# Patient Record
Sex: FEMALE | Race: WHITE | NOT HISPANIC OR LATINO
[De-identification: names, ages, dates, MRNs, and addresses within clinical notes are randomized per-mention and may not be internally consistent; named-entity substitution may affect disease eponyms.]

---

## 2021-12-02 ENCOUNTER — TRANSCRIPTION ENCOUNTER (OUTPATIENT)
Age: 26
End: 2021-12-02

## 2023-02-27 ENCOUNTER — APPOINTMENT (OUTPATIENT)
Dept: ORTHOPEDIC SURGERY | Facility: CLINIC | Age: 28
End: 2023-02-27
Payer: COMMERCIAL

## 2023-02-27 VITALS — WEIGHT: 170 LBS | HEIGHT: 66 IN | BODY MASS INDEX: 27.32 KG/M2

## 2023-02-27 DIAGNOSIS — Z82.49 FAMILY HISTORY OF ISCHEMIC HEART DISEASE AND OTHER DISEASES OF THE CIRCULATORY SYSTEM: ICD-10-CM

## 2023-02-27 DIAGNOSIS — Z78.9 OTHER SPECIFIED HEALTH STATUS: ICD-10-CM

## 2023-02-27 PROCEDURE — 99204 OFFICE O/P NEW MOD 45 MIN: CPT

## 2023-02-27 PROCEDURE — 73562 X-RAY EXAM OF KNEE 3: CPT | Mod: LT

## 2023-02-28 NOTE — IMAGING
[Left] : left knee [AP] : anteroposterior [Lateral] : lateral [[place]] : sk[place] [There are no fractures, subluxations or dislocations. No significant abnormalities are seen] : There are no fractures, subluxations or dislocations. No significant abnormalities are seen [de-identified] : The patient is a well appearing 27 year  old female of their stated age. \par Patient ambulates with a normal gait. \par Negative straight leg raise bilateral \par \par Effected Knee: LEFT                         	 \par ROM:  0-145 degrees \par Lachman: POSITIVE \par Pivot Shift: POSITIVE \par Anterior Drawer: POSITIVE \par Posterior Drawer / Sag:Negative \par Varus Stress 0 degrees: Stable \par Varus Stress 30 degrees: Stable \par Valgus Stress 0 degrees: Stable \par Valgus Stress 30 degrees: Stable \par Medial Gavin: POSITIVE \par Lateral Gavin: Negative \par Patella Glide: 2+ \par Patella Apprehension: Negative \par Patella Grind: Negative \par \par Palpation: \par Medial Joint Line: TENDER \par Lateral Joint Line: Nontender \par Medial Collateral Ligament: TENDER \par Lateral Collateral Ligament/PLC: Nontender \par Distal Femur: Nontender \par Proximal Tibia: Nontender \par Tibial Tubercle: Nontender \par Distal Pole Patella: Nontender \par Quadriceps Tendon: Nontender &  Intact \par Patella Tendon: Nontender &  Intact \par Medial Femoral Condyle: Nontender \par Medial Distal Hamstring/PES: Nontender \par Lateral Distal Hamstring: Nontender & Stable \par Iliotibial Band: Nontender \par Medial Patellofemoral Ligament: Nontender \par Adductor: Nontender \par Proximal GSC-Plantaris: Nontender \par Calf: Supple & Nontender \par \par Inspection: \par Deformity: No \par Erythema: No \par Ecchymosis: No \par Abrasions: No \par Effusion: No \par Prepatella Bursitis: No \par Neurologic Exam: \par Sensation L4-S1: Grossly Intact \par Motor Exam: \par Quadriceps: 5 out of 5 \par Hamstrings: 5 out of 5 \par EHL: 5 out of 5 \par FHL: 5 out of 5 \par TA: 5 out of 5 \par GS: 5 out of 5 \par Circulatory/Pulses: \par Dorsalis Pedis: 2+ \par Posterior Tibialis: 2+ \par Additional Pertinent Findings: None \par \par Contralateral Knee:                           	 \par ROM: 0-145 degrees \par Other Pertinent Findings: None \par \par Assessment: The patient is a 27 year  old female with left knee pain and radiographic and physical exam findings consistent with possible ACL tear, MCL tear, and medial meniscus tear.   \par The patient’s condition is acute \par Documents/Results Reviewed Today: X-Ray left knee \par Tests/Studies Independently Interpreted Today: X-Ray left knee is unremarkable \par Pertinent findings include: MJLT, +Medial Gavin, +LM/PS/AD, MCL tenderness\par Confounding medical conditions/concerns: None\par \par Plan: Due to worsening pain and instability with mechanical symptoms, patient will obtain MRI left knee to evaluate for possible ACL tear, MCL tear and medial meniscus tear. Patient will begin physical therapy. Modify activity as discussed. \par Tests Ordered: MRI left knee \par Prescription Medications Ordered: Discussed appropriate use of OTC anti-inflammatories and analgesics (including but not limited to Aleve, Advil, Tylenol, Motrin, Ibuprofen, Voltaren gel, etc.) \par Braces/DME Ordered: None \par Activity/Work/Sports Status: None \par Additional Instructions: None\par Follow-Up: After MRI \par \par The patient's current medication management of their orthopedic diagnosis was reviewed today:\par (1) We discussed a comprehensive treatment plan that included possible pharmaceutical management involving the use of prescription strength medications including but not limited to options such as oral Naprosyn 500mg BID, once daily Meloxicam 15 mg, or 500-650 mg Tylenol versus over the counter oral medications and topical prescription NSAID Pennsaid vs over the counter Voltaren gel.  Based on our extensive discussion, the patient declined prescription medication and will use over the counter Advil, Alleve, Voltaren Gel or Tylenol as directed.\par (2) There is a moderate risk of morbidity with further treatment, especially from use of prescription strength medications and possible side effects of these medications which include upset stomach with oral medications, skin reactions to topical medications and cardiac/renal issues with long term use.\par (3) I recommended that the patient follow-up with their medical physician to discuss any significant specific potential issues with long term medication use such as interactions with current medications or with exacerbation of underlying medical comorbidities.\par (4) The benefits and risks associated with use of injectable, oral or topical, prescription and over the counter anti-inflammatory medications were discussed with the patient. The patient voiced understanding of the risks including but not limited to bleeding, stroke, kidney dysfunction, heart disease, and were referred to the black box warning label for further information. \par \par IKaren attest that this documentation has been prepared under the direction and in the presence of Provider Dr. Tyler Chaudhary. \par \par The documentation recorded by the scribe accurately reflects the services IDr. Tyler, personally performed and the decisions made by me.\par  [FreeTextEntry9] :  X-Ray left knee is unremarkable

## 2023-02-28 NOTE — HISTORY OF PRESENT ILLNESS
[de-identified] : The patient is a 27 year  old right hand dominant female who presents today complaining of left knee pain.  \par Date of Injury/Onset: 2/19/23\par Pain:    At Rest: 4/10 \par With Activity:  6/10 \par Mechanism of injury: patient was skiing when she hit a patch of ice and fell and her knee got twisted\par This is NOT a Work Related Injury being treated under Worker's Compensation.\par This is NOT an athletic injury occurring associated with an interscholastic or organized sports team.\par Quality of symptoms: instability, dull, fatigued\par Improves with: brace, rest, ice\par Worse with: walking, standing, stairs\par Prior treatment: ED in [place], PT\par Prior Imaging: Xray\par Out of work/sport: no, since n/a\par School/Sport/Position/Occupation: teacher\par Additional Information: None\par

## 2023-03-01 ENCOUNTER — FORM ENCOUNTER (OUTPATIENT)
Age: 28
End: 2023-03-01

## 2023-03-02 ENCOUNTER — APPOINTMENT (OUTPATIENT)
Dept: MRI IMAGING | Facility: CLINIC | Age: 28
End: 2023-03-02
Payer: COMMERCIAL

## 2023-03-02 PROCEDURE — 73721 MRI JNT OF LWR EXTRE W/O DYE: CPT | Mod: LT

## 2023-03-06 ENCOUNTER — APPOINTMENT (OUTPATIENT)
Dept: ORTHOPEDIC SURGERY | Facility: CLINIC | Age: 28
End: 2023-03-06
Payer: COMMERCIAL

## 2023-03-06 VITALS — WEIGHT: 170 LBS | HEIGHT: 66 IN | BODY MASS INDEX: 27.32 KG/M2

## 2023-03-06 PROCEDURE — 99214 OFFICE O/P EST MOD 30 MIN: CPT

## 2023-03-08 NOTE — HISTORY OF PRESENT ILLNESS
[de-identified] : The patient is a 27 year  old right hand dominant female who presents today complaining of left knee pain.  \par Date of Injury/Onset: 2/19/23\par Pain:    At Rest: 4/10 \par With Activity:  6/10 \par Mechanism of injury: patient was skiing when she hit a patch of ice and fell and her knee got twisted\par This is NOT a Work Related Injury being treated under Worker's Compensation.\par This is NOT an athletic injury occurring associated with an interscholastic or organized sports team.\par Quality of symptoms: instability, dull, fatigued\par Improves with: brace, rest, ice\par Worse with: walking, standing, stairs\par Treatments/Images/Studies since last visit: PT, MRI\par                        Reports available for review: MRI report\par Out of work/sport: no, since n/a\par School/Sport/Position/Occupation: teacher\par Changes since last visit: patient is feeling stronger with PT. Had an episode of instability 3/5/23 when he knee buckled in the brace. Here to review MRI results\par Additional Information: None\par

## 2023-03-08 NOTE — IMAGING
[de-identified] : The patient is a well appearing 27 year  old female of their stated age. \par Patient ambulates with a normal gait. \par Negative straight leg raise bilateral \par \par Effected Knee: LEFT                         	 \par ROM:  0-145 degrees \par Lachman: POSITIVE \par Pivot Shift: POSITIVE \par Anterior Drawer: POSITIVE \par Posterior Drawer / Sag:Negative \par Varus Stress 0 degrees: Stable \par Varus Stress 30 degrees: Stable \par Valgus Stress 0 degrees: Stable \par Valgus Stress 30 degrees: Stable \par Medial Gavin: POSITIVE \par Lateral Gavin: Negative \par Patella Glide: 2+ \par Patella Apprehension: Negative \par Patella Grind: Negative \par \par Palpation: \par Medial Joint Line: TENDER \par Lateral Joint Line: Nontender \par Medial Collateral Ligament: TENDER \par Lateral Collateral Ligament/PLC: Nontender \par Distal Femur: Nontender \par Proximal Tibia: Nontender \par Tibial Tubercle: Nontender \par Distal Pole Patella: Nontender \par Quadriceps Tendon: Nontender &  Intact \par Patella Tendon: Nontender &  Intact \par Medial Femoral Condyle: Nontender \par Medial Distal Hamstring/PES: Nontender \par Lateral Distal Hamstring: Nontender & Stable \par Iliotibial Band: Nontender \par Medial Patellofemoral Ligament: Nontender \par Adductor: Nontender \par Proximal GSC-Plantaris: Nontender \par Calf: Supple & Nontender \par \par Inspection: \par Deformity: No \par Erythema: No \par Ecchymosis: No \par Abrasions: No \par Effusion: No \par Prepatella Bursitis: No \par Neurologic Exam: \par Sensation L4-S1: Grossly Intact \par Motor Exam: \par Quadriceps: 5 out of 5 \par Hamstrings: 5 out of 5 \par EHL: 5 out of 5 \par FHL: 5 out of 5 \par TA: 5 out of 5 \par GS: 5 out of 5 \par Circulatory/Pulses: \par Dorsalis Pedis: 2+ \par Posterior Tibialis: 2+ \par Additional Pertinent Findings: None \par \par Contralateral Knee:                           	 \par ROM: 0-145 degrees \par Other Pertinent Findings: None \par \par Assessment: The patient is a 27 year old female with left knee pain and radiographic and physical exam findings consistent with ACL tear, lateral meniscus tear, MCL sprain and associated bone contusion.  \par The patient’s condition is acute \par Documents/Results Reviewed Today: MRI left knee \par Tests/Studies Independently Interpreted Today: MRI left knee reveals ACL tear, lateral meniscus tear, MCL sprain and associated bone contusions. \par Pertinent findings include: MJLT, +Medial Gavin, +LM/PS/AD, MCL tenderness\par Confounding medical conditions/concerns: None\par \par Plan: Discussed conservative treatment, non-treatment, non-surgical intervention and surgical intervention treatment options including left knee arthroscopic assisted anterior cruciate ligament reconstruction with quadriceps tendon autograft with internal brace, partial lateral meniscectomy vs repair and any indicated procedures. All questions and concerns regarding the surgery were addressed. Went over the recovery timeline and expected outcomes following surgery. The patient continues to be symptomatic and has failed conservative treatment, electing to move forward with surgical intervention. \par Tests Ordered: Pre-op and COVID \par Prescription Medications Ordered: Discussed appropriate use of OTC anti-inflammatories and analgesics (including but not limited to Aleve, Advil, Tylenol, Motrin, Ibuprofen, Voltaren gel, etc.) \par Braces/DME Ordered: Cold therapy unit \par Activity/Work/Sports Status: None \par Additional Instructions: None\par Follow-Up: 2 weeks post-op \par \par The patient's current medication management of their orthopedic diagnosis was reviewed today:\par (1) We discussed a comprehensive treatment plan that included possible pharmaceutical management involving the use of prescription strength medications including but not limited to options such as oral Naprosyn 500mg BID, once daily Meloxicam 15 mg, or 500-650 mg Tylenol versus over the counter oral medications and topical prescription NSAID Pennsaid vs over the counter Voltaren gel.  Based on our extensive discussion, the patient declined prescription medication and will use over the counter Advil, Alleve, Voltaren Gel or Tylenol as directed.\par (2) There is a moderate risk of morbidity with further treatment, especially from use of prescription strength medications and possible side effects of these medications which include upset stomach with oral medications, skin reactions to topical medications and cardiac/renal issues with long term use.\par (3) I recommended that the patient follow-up with their medical physician to discuss any significant specific potential issues with long term medication use such as interactions with current medications or with exacerbation of underlying medical comorbidities.\par (4) The benefits and risks associated with use of injectable, oral or topical, prescription and over the counter anti-inflammatory medications were discussed with the patient. The patient voiced understanding of the risks including but not limited to bleeding, stroke, kidney dysfunction, heart disease, and were referred to the black box warning label for further information. \par \par Consent:  Conservative treatment, nontreatment, nonsurgical intervention and surgical intervention treatment options have been reviewed with the patient.  The patient continues to be symptomatic and has failed conservative treatment, and elects to move forward with surgical intervention.  The patient is indicated for  left knee arthroscopic assisted anterior cruciate ligament reconstruction with quadriceps tendon autograft with internal brace, partial lateral meniscectomy vs repair and all indicated procedures. As such the alternatives, benefits and risks, of the above procedure, including but not limited to bleeding, infection, neurovascular injury, loss of limb, loss of life,  DVT, PE, RSD, inability to return to previous level of activity, inability to return to previous level of employment, advancement of or to osteoarthritic changes, joint instability or motion loss, hardware failure or migration, failure to resolve all symptoms, failure to return to sports and need for further procedures, as well as specific risk of meniscus repair failure, anterior knee pain and patella fracture, and need for future joint arthroplasty were discussed with the patient and/or their legal guardian who agreed to move forward with surgical intervention.  They have reviewed and signed the consent form today after expressing understanding of the above documented conversation. The patient or their representative will contact my office as instructed on the preoperative instruction sheet they received today to schedule surgery in a timely manner as discussed.\par \par \par \par \par \par Karen WEBB attest that this documentation has been prepared under the direction and in the presence of Provider Dr. Tyler Chaudhary. \par \par The documentation recorded by the scribe accurately reflects the services IDr. Tyler, personally performed and the decisions made by me.\par

## 2023-03-08 NOTE — DATA REVIEWED
[MRI] : MRI [Left] : left [Knee] : knee [Report was reviewed and noted in the chart] : The report was reviewed and noted in the chart [I independently reviewed and interpreted images and report] : I independently reviewed and interpreted images and report [I reviewed the films/CD and additionally noted] : I reviewed the films/CD and additionally noted [FreeTextEntry1] : MRI left knee reveals ACL tear, lateral meniscus tear, MCL sprain and associated bone contusions.

## 2023-03-24 ENCOUNTER — NON-APPOINTMENT (OUTPATIENT)
Age: 28
End: 2023-03-24

## 2023-04-11 RX ORDER — SULFAMETHOXAZOLE AND TRIMETHOPRIM 800; 160 MG/1; MG/1
800-160 TABLET ORAL TWICE DAILY
Qty: 10 | Refills: 0 | Status: ACTIVE | COMMUNITY
Start: 2023-04-11 | End: 1900-01-01

## 2023-04-11 RX ORDER — HYDROCODONE BITARTRATE AND ACETAMINOPHEN 7.5; 325 MG/1; MG/1
7.5-325 TABLET ORAL
Qty: 30 | Refills: 0 | Status: ACTIVE | COMMUNITY
Start: 2023-04-11 | End: 1900-01-01

## 2023-04-11 RX ORDER — DOCUSATE SODIUM 100 MG/1
100 CAPSULE ORAL 3 TIMES DAILY
Qty: 21 | Refills: 0 | Status: ACTIVE | COMMUNITY
Start: 2023-04-11 | End: 1900-01-01

## 2023-04-11 RX ORDER — ONDANSETRON 4 MG/1
4 TABLET ORAL
Qty: 15 | Refills: 0 | Status: ACTIVE | COMMUNITY
Start: 2023-04-11 | End: 1900-01-01

## 2023-04-12 ENCOUNTER — APPOINTMENT (OUTPATIENT)
Dept: ORTHOPEDIC SURGERY | Facility: AMBULATORY SURGERY CENTER | Age: 28
End: 2023-04-12
Payer: COMMERCIAL

## 2023-04-12 PROCEDURE — 29888 ARTHRS AID ACL RPR/AGMNTJ: CPT | Mod: LT

## 2023-04-12 PROCEDURE — 29888 ARTHRS AID ACL RPR/AGMNTJ: CPT | Mod: AS,LT

## 2023-04-12 PROCEDURE — 29883 ARTHRS KNE SRG MNISC RPR M&L: CPT | Mod: AS,59,LT

## 2023-04-12 PROCEDURE — 29883 ARTHRS KNE SRG MNISC RPR M&L: CPT | Mod: 59,LT

## 2023-04-25 ENCOUNTER — APPOINTMENT (OUTPATIENT)
Dept: ORTHOPEDIC SURGERY | Facility: CLINIC | Age: 28
End: 2023-04-25
Payer: COMMERCIAL

## 2023-04-25 VITALS — BODY MASS INDEX: 27.32 KG/M2 | WEIGHT: 170 LBS | HEIGHT: 66 IN

## 2023-04-25 DIAGNOSIS — T14.8XXA OTHER INJURY OF UNSPECIFIED BODY REGION, INITIAL ENCOUNTER: ICD-10-CM

## 2023-04-25 DIAGNOSIS — S83.412A SPRAIN OF MEDIAL COLLATERAL LIGAMENT OF LEFT KNEE, INITIAL ENCOUNTER: ICD-10-CM

## 2023-04-25 PROCEDURE — 99024 POSTOP FOLLOW-UP VISIT: CPT

## 2023-04-25 NOTE — PHYSICAL EXAM
[de-identified] : The patient is a well appearing 27 year old female of their stated age.\par Patient ambulates with a normal gait.\par Negative straight leg raise bilateral\par \par Surgical site: left knee \par \par Incision sites: Well approximated, clean, dry, intact, without drainage, without erythema \par \par Range of motion: 0-80 \par \par Motor Testing: quad firing, able to SLR \par \par Stability Testing: Limited by pain \par \par Swelling/Effusion: None \par \par Tenderness to palpation: None \par \par Provocative testing: Limited by pain \par \par Right Calf: soft and nontender \par Left Calf: soft and nontender \par  \par Neurovascular Examination: Grossly intact, 2+ distal pulses \par Contralateral Extremity: Examination grossly benign \par \par \par Assessment & Plan: The patient is approximately 2 weeks s/p left knee EUA, arthroscopic anterior cruciate ligament reconstruction with quad tendon autograft with internal brace, lateral meniscus repair (DOS: 4/12/2023). Sutures removed and Steri Strips applied today. The patient is instructed on wound management. The patient's post-op plan, protocol and activity modifications have been thoroughly discussed and the patient expressed understanding. Reviewed operative images with the patient. Patient will continue use of brace as discussed. Continue physical therapy, HEP, and stretching. The patient will control pain as discussed & continue ice and elevation as needed. The patient otherwise may advance activity as discussed. Follow up 4 weeks. \par \par The patient's current medication management of their orthopedic diagnosis was reviewed today:\par (1) The patient will continue with the PRN use of their prescribed anti-inflammatory.\par (2) There is a moderate risk of morbidity with further treatment, especially from use of prescription strength medications and possible side effects of these medications which include upset stomach with oral medications, skin reactions to topical medications and cardiac/renal issues with long term use.\par (3) I recommended that the patient follow-up with their medical physician to discuss any significant specific potential issues with long term medication use such as interactions with current medications or with exacerbation of underlying medical comorbidities.\par (4) The benefits and risks associated with use of injectable, oral or topical, prescription and over the counter anti-inflammatory medications were discussed with the patient. The patient voiced understanding of the risks including but not limited to bleeding, stroke, kidney dysfunction, heart disease, and were referred to the black box warning label for further information.\par \par I, Wendy Angel, attest that this documentation has been prepared under the direction and in the presence of Mayra Art PA-C.\par \par The documentation recorded by the scribe accurately reflects the service I, Mayra Art PA-C, personally performed and the decisions made by me.\par

## 2023-04-25 NOTE — HISTORY OF PRESENT ILLNESS
[de-identified] : The patient is s/p left knee EUA, arthroscopic ACL reconstruction with quad tendon autograft with IB, LMR  \par Date of Surgery: 4/12/23\par Pain:    At Rest: 4/10 \par With Activity:  8/10 \par Mechanism of injury:  patient was skiing when she hit a patch of ice and fell and her knee got twisted\par This is not a Work Related Injury being treated under Worker's Compensation.\par This is not an athletic injury occurring associated with an interscholastic or organized sports team.\par Treatment/Imaging/Studies Since Last Visit: Surgery, PT\par 	Reports Available For Review Today: op report, op pics\par Out of work/sport: out of work since surgery\par School/Sport/Position/Occupation: teacher\par Changes since last visit: surgery\par Additional Information: None\par \par

## 2023-05-22 ENCOUNTER — APPOINTMENT (OUTPATIENT)
Dept: ORTHOPEDIC SURGERY | Facility: CLINIC | Age: 28
End: 2023-05-22
Payer: COMMERCIAL

## 2023-05-22 VITALS — BODY MASS INDEX: 27.32 KG/M2 | WEIGHT: 170 LBS | HEIGHT: 66 IN

## 2023-05-22 PROCEDURE — 99024 POSTOP FOLLOW-UP VISIT: CPT

## 2023-05-23 NOTE — PHYSICAL EXAM
[de-identified] : The patient is a well appearing 27 year old female of their stated age.\par Patient ambulates with a normal gait.\par Negative straight leg raise bilateral\par \par Surgical site: Left knee \par \par Incision sites: Well approximated, clean, dry, intact, without drainage, without erythema \par \par Range of motion: 0-130\par \par Motor Testin/5 quad strength \par \par Stability Testing: Limited by pain \par \par Swelling/Effusion: None \par \par Tenderness to palpation: None \par \par Provocative testing: Limited by pain \par \par Right Calf: soft and nontender \par Left Calf: soft and nontender \par  \par Neurovascular Examination: Grossly intact, 2+ distal pulses \par Contralateral Extremity: Examination grossly benign \par \par \par Assessment & Plan: The patient is approximately 6 weeks s/p left knee EUA, arthroscopic anterior cruciate ligament reconstruction with quad tendon autograft with internal brace, lateral meniscus repair (DOS: 2023). The patient's post-op plan, protocol and activity modifications have been thoroughly discussed and the patient expressed understanding. Patient will continue use of brace as discussed. Continue physical therapy, HEP, and stretching. The patient will control pain as discussed & continue ice and elevation as needed. The patient otherwise may advance activity as discussed. Follow up 6 weeks. \par \par The patient's current medication management of their orthopedic diagnosis was reviewed today:\par (1) We discussed a comprehensive treatment plan that included possible pharmaceutical management involving the use of prescription strength medications including but not limited to options such as oral Naprosyn 500mg BID, once daily Meloxicam 15 mg, or 500-650 mg Tylenol versus over the counter oral medications and topical prescription NSAID Pennsaid vs over the counter Voltaren gel.  Based on our extensive discussion, the patient declined prescription medication and will use over the counter Advil, Alleve, Voltaren Gel or Tylenol as directed.\par (2) There is a moderate risk of morbidity with further treatment, especially from use of prescription strength medications and possible side effects of these medications which include upset stomach with oral medications, skin reactions to topical medications and cardiac/renal issues with long term use.\par (3) I recommended that the patient follow-up with their medical physician to discuss any significant specific potential issues with long term medication use such as interactions with current medications or with exacerbation of underlying medical comorbidities.\par (4) The benefits and risks associated with use of injectable, oral or topical, prescription and over the counter anti-inflammatory medications were discussed with the patient. The patient voiced understanding of the risks including but not limited to bleeding, stroke, kidney dysfunction, heart disease, and were referred to the black box warning label for further information. \par \par Karen WEBB attest that this documentation has been prepared under the direction and in the presence of Provider Dr. Tyler Chaudhary. \par \par The documentation recorded by the scribe accurately reflects the service I, Mayra Art PA-C, personally performed and the decisions made by me.\par The patient was seen by me under the direct supervision of Dr. Tyler Chaudhary.\par

## 2023-05-23 NOTE — HISTORY OF PRESENT ILLNESS
[de-identified] : The patient is s/p left knee EUA, arthroscopic ACL reconstruction with quad tendon autograft with IB, LMR  \par Date of Surgery: 4/12/23\par Pain:    At Rest: 2-3/10 \par With Activity:  6/10 \par Mechanism of injury:  patient was skiing when she hit a patch of ice and fell and her knee got twisted\par This is not a Work Related Injury being treated under Worker's Compensation.\par This is not an athletic injury occurring associated with an interscholastic or organized sports team.\par Treatment/Imaging/Studies Since Last Visit: PT\par 	Reports Available For Review Today: none\par Out of work/sport: out of work since surgery\par School/Sport/Position/Occupation: teacher\par Changes since last visit: Patient is making progress in PT with ROM and strength. Reports that she had some inflammation around her patella that was limiting her ROM\par Additional Information: None\par \par

## 2023-07-17 ENCOUNTER — APPOINTMENT (OUTPATIENT)
Dept: ORTHOPEDIC SURGERY | Facility: CLINIC | Age: 28
End: 2023-07-17
Payer: COMMERCIAL

## 2023-07-17 VITALS — BODY MASS INDEX: 28.12 KG/M2 | HEIGHT: 66 IN | WEIGHT: 175 LBS

## 2023-07-17 PROCEDURE — 99213 OFFICE O/P EST LOW 20 MIN: CPT

## 2023-07-17 NOTE — PHYSICAL EXAM
[de-identified] : The patient is a well appearing 27 year old female of their stated age.\par Patient ambulates with a normal gait.\par Negative straight leg raise bilateral\par \par Surgical site: Left knee \par \par Incision sites: Well approximated, clean, dry, intact, without drainage, without erythema \par \par Range of motion: 0-130\par \par Motor Testin/5 quad strength \par \par Stability Testing: Limited by pain \par \par Swelling/Effusion: None \par \par Tenderness to palpation: None \par \par Provocative testing: Limited by pain \par \par Right Calf: soft and nontender \par Left Calf: soft and nontender \par  \par Neurovascular Examination: Grossly intact, 2+ distal pulses \par Contralateral Extremity: Examination grossly benign \par \par \par Assessment & Plan: The patient is approximately 6 weeks s/p left knee EUA, arthroscopic anterior cruciate ligament reconstruction with quad tendon autograft with internal brace, lateral meniscus repair (DOS: 2023). The patient's post-op plan, protocol and activity modifications have been thoroughly discussed and the patient expressed understanding. Patient will continue use of brace as discussed. Continue physical therapy, HEP, and stretching. The patient will control pain as discussed & continue ice and elevation as needed. The patient otherwise may advance activity as discussed. Follow up 6 weeks. \par \par The patient's current medication management of their orthopedic diagnosis was reviewed today:\par (1) We discussed a comprehensive treatment plan that included possible pharmaceutical management involving the use of prescription strength medications including but not limited to options such as oral Naprosyn 500mg BID, once daily Meloxicam 15 mg, or 500-650 mg Tylenol versus over the counter oral medications and topical prescription NSAID Pennsaid vs over the counter Voltaren gel.  Based on our extensive discussion, the patient declined prescription medication and will use over the counter Advil, Alleve, Voltaren Gel or Tylenol as directed.\par (2) There is a moderate risk of morbidity with further treatment, especially from use of prescription strength medications and possible side effects of these medications which include upset stomach with oral medications, skin reactions to topical medications and cardiac/renal issues with long term use.\par (3) I recommended that the patient follow-up with their medical physician to discuss any significant specific potential issues with long term medication use such as interactions with current medications or with exacerbation of underlying medical comorbidities.\par (4) The benefits and risks associated with use of injectable, oral or topical, prescription and over the counter anti-inflammatory medications were discussed with the patient. The patient voiced understanding of the risks including but not limited to bleeding, stroke, kidney dysfunction, heart disease, and were referred to the black box warning label for further information. \par \par Karen WEBB attest that this documentation has been prepared under the direction and in the presence of Provider Dr. Tyler Chaudhary. \par \par The documentation recorded by the scribe accurately reflects the service I, Mayra Art PA-C, personally performed and the decisions made by me.\par The patient was seen by me under the direct supervision of Dr. Tyler Chaudhary.\par

## 2023-07-18 NOTE — IMAGING
[de-identified] : The patient is a well appearing 27 year old female of their stated age.\par Patient ambulates with a normal gait.\par Negative straight leg raise bilateral\par \par Surgical site: Left knee \par \par Incision sites: Well healed \par \par Range of motion: 0-140\par \par Motor Testin+/5 quad strength \par \par Stability Testing: Limited by pain \par \par Swelling/Effusion: None \par \par Tenderness to palpation: None \par \par Provocative testing: -LM/PS/AD\par \par Right Calf: soft and nontender \par Left Calf: soft and nontender \par  \par Neurovascular Examination: Grossly intact, 2+ distal pulses \par Contralateral Extremity: Examination grossly benign \par \par \par Assessment & Plan: The patient is approximately 3 months s/p left knee EUA, arthroscopic anterior cruciate ligament reconstruction with quad tendon autograft with internal brace, lateral meniscus repair (DOS: 2023). The patient's post-op plan, protocol and activity modifications have been thoroughly discussed and the patient expressed understanding. Continue physical therapy, HEP, and stretching. THIS SERVES AS A LETTER OF MEDICALLY NECESSITY THAT THE PATIENT RETURN TO PHYSICAL THERAPY IN ORDER TO ENSURE AN OPTIMAL SURGICAL OUTCOME. The patient may start to advance with straight line activity avoid any pivoting or cutting movements. The patient will control pain as discussed & continue ice and elevation as needed. The patient otherwise may advance activity as discussed. Follow up 6 weeks. \par \par The patient's current medication management of their orthopedic diagnosis was reviewed today:\par (1) We discussed a comprehensive treatment plan that included possible pharmaceutical management involving the use of prescription strength medications including but not limited to options such as oral Naprosyn 500mg BID, once daily Meloxicam 15 mg, or 500-650 mg Tylenol versus over the counter oral medications and topical prescription NSAID Pennsaid vs over the counter Voltaren gel.  Based on our extensive discussion, the patient declined prescription medication and will use over the counter Advil, Alleve, Voltaren Gel or Tylenol as directed.\par (2) There is a moderate risk of morbidity with further treatment, especially from use of prescription strength medications and possible side effects of these medications which include upset stomach with oral medications, skin reactions to topical medications and cardiac/renal issues with long term use.\par (3) I recommended that the patient follow-up with their medical physician to discuss any significant specific potential issues with long term medication use such as interactions with current medications or with exacerbation of underlying medical comorbidities.\par (4) The benefits and risks associated with use of injectable, oral or topical, prescription and over the counter anti-inflammatory medications were discussed with the patient. The patient voiced understanding of the risks including but not limited to bleeding, stroke, kidney dysfunction, heart disease, and were referred to the black box warning label for further information. \par \par Karen WEBB attest that this documentation has been prepared under the direction and in the presence of Provider Dr. Tyler Chaudhary. \par \par The documentation recorded by the scribe accurately reflects the service I Bernardino Mcfarlane PA-C personally performed and the decisions made by me.\par The patient was seen by me under the direct supervision of Dr. Tyler Chaudhary\par

## 2023-07-18 NOTE — HISTORY OF PRESENT ILLNESS
[de-identified] : This patient is a 28 year right hand dominate female who presents today for a left knee post op follow up\par   Date of Surgery: 4/12/23\par Pain:    At Rest: 1-2/10 \par With Activity:  4/10 \par Mechanism of injury:  patient was skiing when she hit a patch of ice and fell and her knee got twisted\par This is not a Work Related Injury being treated under Worker's Compensation.\par This is not an athletic injury occurring associated with an interscholastic or organized sports team.\par Quality of symptoms: throbbing, dull pain. Sharp when sitting.\par Improves with: rest, icing while in PT\par Worse with: running, lunges\par Treatment/Imaging/Studies Since Last Visit: PT\par 	Reports Available For Review Today: none\par Out of work/sport: out of work since surgery. 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 Reports that she had some inflammation around her patella that was limiting her ROM. 07/27/23 still swollen by the patella but is getting better and stronger\par Additional Information: s/p left knee EUA, arthroscopic ACL reconstruction with quad tendon autograft with IB, LMR \par

## 2023-08-28 ENCOUNTER — APPOINTMENT (OUTPATIENT)
Dept: ORTHOPEDIC SURGERY | Facility: CLINIC | Age: 28
End: 2023-08-28
Payer: COMMERCIAL

## 2023-08-28 VITALS — WEIGHT: 175 LBS | HEIGHT: 66 IN | BODY MASS INDEX: 28.12 KG/M2

## 2023-08-28 PROCEDURE — 99213 OFFICE O/P EST LOW 20 MIN: CPT

## 2023-08-29 NOTE — IMAGING
[de-identified] : The patient is a well appearing 27 year old female of their stated age. Patient ambulates with a normal gait. Negative straight leg raise bilateral  Surgical site: Left knee   Incision sites: Well healed   Range of motion: 0-145   Motor Testin-/5 quad strength   Stability Testing: Slight varus and valgus jog   Swelling/Effusion: None   Tenderness to palpation: None   Provocative testing: -LM/PS/AD  Right Calf: soft and nontender  Left Calf: soft and nontender    Neurovascular Examination: Grossly intact, 2+ distal pulses  Contralateral Extremity: Examination grossly benign    Assessment & Plan: The patient is approximately 4 months s/p left knee EUA, arthroscopic anterior cruciate ligament reconstruction with quad tendon autograft with internal brace, lateral meniscus repair (DOS: 2023). The patient's post-op plan, protocol and activity modifications have been thoroughly discussed and the patient expressed understanding. Discussed the importance to continue with physical therapy to work on strengtheinng and proprioception as the patient continues to advance activity The patient will continue physical therapy, HEP, and stretching. THIS SERVES AS A LETTER OF MEDICALLY NECESSITY THAT THE PATIENT RETURN TO PHYSICAL THERAPY IN ORDER TO ENSURE AN OPTIMAL SURGICAL OUTCOME AND LIMIT THE RISK OF FAILURE OF THE ABOVE MENTIONED SURGERY AND PREVENT OTHER SURGICAL INTERVENTION OR REINJURY. The patient has been prescribed a custom A22 defiance knee brace. She may start to advance with straight line activity avoid any pivoting or cutting movements until obtaining custom defiance brace. The patient will control pain as discussed & continue ice and elevation as needed. The patient otherwise may advance activity as discussed. Follow up 6 weeks.   Letter of Medical Necessity for Custom Knee Brace   The patient is prescribed a custom ACL defiance brace today for return to activities of daily living. This brace is indicated to protect the surgically repaired knee due to instability during the continued ligamentous healing process, and while the patient increases their activities of daily living. At this point the patient presents with sustained muscle atrophy, proprioceptive deficiency, instability and motor imbalance in their involved knee. The custom nature of the brace is required to match the natural contours of this patient's thigh to calf ratio which would not fit into an off the shelf model due to surgical changes and thigh atrophy.    The patient's current medication management of their orthopedic diagnosis was reviewed today: (1) We discussed a comprehensive treatment plan that included possible pharmaceutical management involving the use of prescription strength medications including but not limited to options such as oral Naprosyn 500mg BID, once daily Meloxicam 15 mg, or 500-650 mg Tylenol versus over the counter oral medications and topical prescription NSAID Pennsaid vs over the counter Voltaren gel.  Based on our extensive discussion, the patient declined prescription medication and will use over the counter Advil, Alleve, Voltaren Gel or Tylenol as directed. (2) There is a moderate risk of morbidity with further treatment, especially from use of prescription strength medications and possible side effects of these medications which include upset stomach with oral medications, skin reactions to topical medications and cardiac/renal issues with long term use. (3) I recommended that the patient follow-up with their medical physician to discuss any significant specific potential issues with long term medication use such as interactions with current medications or with exacerbation of underlying medical comorbidities. (4) The benefits and risks associated with use of injectable, oral or topical, prescription and over the counter anti-inflammatory medications were discussed with the patient. The patient voiced understanding of the risks including but not limited to bleeding, stroke, kidney dysfunction, heart disease, and were referred to the black box warning label for further information.   Karen WEBB attest that this documentation has been prepared under the direction and in the presence of Provider Dr. Tyler Chaudhary.   The documentation recorded by the scribe accurately reflects the service Mayra WEBB PA-C, personally performed and the decisions made by me. The patient was seen by me under the direct supervision of Dr. Tyler Chaudhary.

## 2023-08-29 NOTE — HISTORY OF PRESENT ILLNESS
[de-identified] : This patient is a 28 year right hand dominate female who presents today for a left knee post op follow up   Date of Surgery: 4/12/23 Pain:    At Rest: 0/10  With Activity:  3/10  Mechanism of injury:  patient was skiing when she hit a patch of ice and fell and her knee got twisted This is not a Work Related Injury being treated under Worker's Compensation. This is not an athletic injury occurring associated with an interscholastic or organized sports team. Quality of symptoms: soreness, discomfort, weakness, stiffness upon waking Improves with: rest, PT/HEP Worse with: running, lunges Treatment/Imaging/Studies Since Last Visit: PT/HEP 	Reports Available For Review Today: none Out of work/sport: currently working School/Sport/Position/Occupation: teacher Changes since last visit: Patient is making progress in PT but still has c/o weakness and is unable to jump or run without discomfort and would like to get back to [employer] as well as skiing in the winter Additional Information: s/p left knee EUA, arthroscopic ACL reconstruction with quad tendon autograft with IB, LMR

## 2023-10-09 ENCOUNTER — APPOINTMENT (OUTPATIENT)
Dept: ORTHOPEDIC SURGERY | Facility: CLINIC | Age: 28
End: 2023-10-09

## 2023-10-23 ENCOUNTER — APPOINTMENT (OUTPATIENT)
Dept: ORTHOPEDIC SURGERY | Facility: CLINIC | Age: 28
End: 2023-10-23
Payer: COMMERCIAL

## 2023-10-23 VITALS — WEIGHT: 175 LBS | BODY MASS INDEX: 28.12 KG/M2 | HEIGHT: 66 IN

## 2023-10-23 PROCEDURE — 99213 OFFICE O/P EST LOW 20 MIN: CPT

## 2023-12-26 ENCOUNTER — NON-APPOINTMENT (OUTPATIENT)
Age: 28
End: 2023-12-26

## 2024-04-01 ENCOUNTER — APPOINTMENT (OUTPATIENT)
Dept: ORTHOPEDIC SURGERY | Facility: CLINIC | Age: 29
End: 2024-04-01

## 2024-06-17 ENCOUNTER — APPOINTMENT (OUTPATIENT)
Dept: ORTHOPEDIC SURGERY | Facility: CLINIC | Age: 29
End: 2024-06-17
Payer: COMMERCIAL

## 2024-06-17 VITALS — HEIGHT: 66 IN | BODY MASS INDEX: 28.12 KG/M2 | WEIGHT: 175 LBS

## 2024-06-17 DIAGNOSIS — S83.512A SPRAIN OF ANTERIOR CRUCIATE LIGAMENT OF LEFT KNEE, INITIAL ENCOUNTER: ICD-10-CM

## 2024-06-17 DIAGNOSIS — M25.562 PAIN IN LEFT KNEE: ICD-10-CM

## 2024-06-17 DIAGNOSIS — S83.282A OTHER TEAR OF LATERAL MENISCUS, CURRENT INJURY, LEFT KNEE, INITIAL ENCOUNTER: ICD-10-CM

## 2024-06-17 PROCEDURE — 99213 OFFICE O/P EST LOW 20 MIN: CPT

## 2024-06-18 NOTE — HISTORY OF PRESENT ILLNESS
[de-identified] : This patient is a 28 year right hand dominate female who presents today for a left knee follow up   Date of Surgery: 4/12/23 Pain:    At Rest: 0/10  With Activity:  4/10  Mechanism of injury:  patient was skiing when she hit a patch of ice and fell and her knee got twisted This is not a Work Related Injury being treated under Worker's Compensation. This is not an athletic injury occurring associated with an interscholastic or organized sports team. 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 Never inquired about getting a functional ACL brace. Additional Information: s/p left knee EUA, arthroscopic ACL reconstruction with quad tendon autograft with IBTEER

## 2024-06-18 NOTE — IMAGING
[de-identified] : The patient is a well appearing 27 year old female of their stated age. Patient ambulates with a normal gait. Negative straight leg raise bilateral  Surgical site: Left knee   Incision sites: Well healed   Range of motion: 0-145   Motor Testin-/5 quad strength   Stability Testing: Slight varus and valgus jog   Swelling/Effusion: None   Tenderness to palpation: None   Provocative testing: -LM/PS/AD  Right Calf: soft and nontender  Left Calf: soft and nontender    Neurovascular Examination: Grossly intact, 2+ distal pulses  Contralateral Extremity: Examination grossly benign    Assessment & Plan: The patient is approximately 1 year s/p left knee EUA, arthroscopic anterior cruciate ligament reconstruction with quad tendon autograft with internal brace, lateral meniscus repair (DOS: 2023). The patient's post-op plan, protocol and activity modifications have been thoroughly discussed and the patient expressed understanding. The patient will continue with proper HEP and stretching and start up physical therapy again. Prescription given again to obtain her previously prescribed custom A22 defiance knee brace. Upon obtaining her brace, she is cleared for all activity. Continue to advance activity on a gradual basis. The patient will control pain as discussed & continue ice and elevation as needed. The patient otherwise may advance activity as discussed. The patient will follow up on a PRN basis unless new symptoms arise.  Letter of Medical Necessity for Custom Knee Brace   The patient is prescribed a custom ACL defiance brace today for return to activities of daily living. This brace is indicated to protect the surgically repaired knee due to instability during the continued ligamentous healing process, and while the patient increases their activities of daily living. At this point the patient presents with sustained muscle atrophy, proprioceptive deficiency, instability and motor imbalance in their involved knee. The custom nature of the brace is required to match the natural contours of this patient's thigh to calf ratio which would not fit into an off the shelf model due to surgical changes and thigh atrophy.   The patient's current medication management of their orthopedic diagnosis was reviewed today: (1) We discussed a comprehensive treatment plan that included possible pharmaceutical management involving the use of prescription strength medications including but not limited to options such as oral Naprosyn 500mg BID, once daily Meloxicam 15 mg, or 500-650 mg Tylenol versus over the counter oral medications and topical prescription NSAID Pennsaid vs over the counter Voltaren gel.  Based on our extensive discussion, the patient declined prescription medication and will use over the counter Advil, Alleve, Voltaren Gel or Tylenol as directed. (2) There is a moderate risk of morbidity with further treatment, especially from use of prescription strength medications and possible side effects of these medications which include upset stomach with oral medications, skin reactions to topical medications and cardiac/renal issues with long term use. (3) I recommended that the patient follow-up with their medical physician to discuss any significant specific potential issues with long term medication use such as interactions with current medications or with exacerbation of underlying medical comorbidities. (4) The benefits and risks associated with use of injectable, oral or topical, prescription and over the counter anti-inflammatory medications were discussed with the patient. The patient voiced understanding of the risks including but not limited to bleeding, stroke, kidney dysfunction, heart disease, and were referred to the black box warning label for further information.   Cathi WEBB attest that this documentation has been prepared under the direction and in the presence of Provider Dr. Tyler Chaudhary.  The documentation recorded by the scribe accurately reflects the services Dr. Tyler WEBB, personally performed and the decisions made by me.

## 2024-10-28 ENCOUNTER — RX ONLY (RX ONLY)
Age: 29
End: 2024-10-28

## 2024-10-28 ENCOUNTER — OFFICE (OUTPATIENT)
Dept: URBAN - METROPOLITAN AREA CLINIC 70 | Facility: CLINIC | Age: 29
Setting detail: OPHTHALMOLOGY
End: 2024-10-28
Payer: COMMERCIAL

## 2024-10-28 DIAGNOSIS — H16.041: ICD-10-CM

## 2024-10-28 PROCEDURE — 99203 OFFICE O/P NEW LOW 30 MIN: CPT | Performed by: OPTOMETRIST

## 2024-10-28 ASSESSMENT — CONFRONTATIONAL VISUAL FIELD TEST (CVF)
OS_FINDINGS: FULL
OD_FINDINGS: FULL

## 2024-10-28 ASSESSMENT — VISUAL ACUITY
OD_BCVA: 20/20-1
OS_BCVA: 20/20-2

## 2024-11-04 ENCOUNTER — OFFICE (OUTPATIENT)
Dept: URBAN - METROPOLITAN AREA CLINIC 70 | Facility: CLINIC | Age: 29
Setting detail: OPHTHALMOLOGY
End: 2024-11-04
Payer: COMMERCIAL

## 2024-11-04 ENCOUNTER — RX ONLY (RX ONLY)
Age: 29
End: 2024-11-04

## 2024-11-04 DIAGNOSIS — H16.041: ICD-10-CM

## 2024-11-04 PROCEDURE — 99213 OFFICE O/P EST LOW 20 MIN: CPT | Performed by: OPTOMETRIST

## 2024-11-04 ASSESSMENT — CONFRONTATIONAL VISUAL FIELD TEST (CVF)
OS_FINDINGS: FULL
OD_FINDINGS: FULL

## 2024-11-04 ASSESSMENT — REFRACTION_CURRENTRX
OS_OVR_VA: 20/
OD_AXIS: 180
OD_OVR_VA: 20/
OD_SPHERE: -3.00
OD_VPRISM_DIRECTION: SV
OD_CYLINDER: -0.75
OS_VPRISM_DIRECTION: SV
OS_AXIS: 002
OS_CYLINDER: -1.25
OS_SPHERE: -3.00

## 2024-11-04 ASSESSMENT — VISUAL ACUITY
OS_BCVA: 20/20-1
OD_BCVA: 20/20

## 2024-11-11 ENCOUNTER — OFFICE (OUTPATIENT)
Dept: URBAN - METROPOLITAN AREA CLINIC 70 | Facility: CLINIC | Age: 29
Setting detail: OPHTHALMOLOGY
End: 2024-11-11
Payer: COMMERCIAL

## 2024-11-11 DIAGNOSIS — H52.13: ICD-10-CM

## 2024-11-11 PROCEDURE — CLEST CL LENS FIT ESTABLISHED WEARER FITTING ONLY: Performed by: OPTOMETRIST

## 2024-11-11 ASSESSMENT — REFRACTION_CURRENTRX
OD_VPRISM_DIRECTION: SV
OS_OVR_VA: 20/
OS_AXIS: 002
OD_OVR_VA: 20/
OS_CYLINDER: -1.25
OS_VPRISM_DIRECTION: SV
OS_SPHERE: -3.00
OD_AXIS: 180
OD_CYLINDER: -0.75
OD_SPHERE: -3.00

## 2024-11-11 ASSESSMENT — REFRACTION_MANIFEST
OD_CYLINDER: -1.25
OD_AXIS: 180
OS_VA1: 20/20
OS_CYLINDER: -1.25
OU_VA: 20/20
OD_SPHERE: -3.25
OS_AXIS: 180
OD_VA1: 20/20
OS_SPHERE: -2.75

## 2024-11-11 ASSESSMENT — CONFRONTATIONAL VISUAL FIELD TEST (CVF)
OD_FINDINGS: FULL
OS_FINDINGS: FULL

## 2024-11-11 ASSESSMENT — REFRACTION_AUTOREFRACTION
OS_SPHERE: -2.75
OD_AXIS: 010
OS_AXIS: 178
OS_CYLINDER: -1.75
OD_CYLINDER: -1.25
OD_SPHERE: -3.00

## 2024-11-11 ASSESSMENT — VISUAL ACUITY
OS_BCVA: 20/20-2
OD_BCVA: 20/20-1

## 2024-11-11 ASSESSMENT — KERATOMETRY
OS_AXISANGLE_DEGREES: 010
OD_K1POWER_DIOPTERS: 45.25
OS_K1POWER_DIOPTERS: 40.50
OD_K2POWER_DIOPTERS: 46.25
OS_K2POWER_DIOPTERS: 40.75
OD_AXISANGLE_DEGREES: 106